# Patient Record
Sex: FEMALE | Race: ASIAN | Employment: UNEMPLOYED | URBAN - METROPOLITAN AREA
[De-identification: names, ages, dates, MRNs, and addresses within clinical notes are randomized per-mention and may not be internally consistent; named-entity substitution may affect disease eponyms.]

---

## 2017-02-03 ENCOUNTER — HOSPITAL ENCOUNTER (OUTPATIENT)
Dept: FAMILY PLANNING/WOMEN'S HEALTH CLINIC | Age: 27
Discharge: HOME | End: 2017-02-03
Payer: COMMERCIAL

## 2017-02-03 PROCEDURE — 99202 OFFICE O/P NEW SF 15 MIN: CPT

## 2017-02-03 NOTE — DISCHARGE INSTRUCTIONS
INCREASING MILK SUPPLY    [address] follows the recommendations of the Academy of Breastfeeding Medicine and International Lactation [employer] Association for increasing milk supply. These recommendations are based upon current evidence and apply to women experiencing difficulties with a low rate of milk production. · Skin to skin contact between mother and baby encourages frequent feeding and stimulates the release of hormones that help the milk to let down. · Lactating women need to maintain adequate calorie and fluid intake and rest when baby is sleeping. Try to take in an additional 500 calories per day per baby. Drink to thirst but it is not necessary to force fluids. · Self breast massage prior to feeding and breast compression while the baby is nursing may help to increase the intake of breast milk. Massage, light stroking toward the nipple and gentle shaking while leaning forward when baby is not latched on may help stimulate a second let down. · Relaxation techniques such as deep breathing, meditation, soft music and dim lighting may help to facilitate let down. · Increase stimulation and milk removal by feeding frequently and latching baby deeply to promote thorough drainage of the breasts. Pump if baby is not latching. (Minimum 8 feedings/pumpings in a 24 hour period and 12 times maximum)  · Pumping for 10-15 minutes after breastfeeding with an automatic cycling breast pump that is capable of draining both breasts, hospital grade if available, will increase stimulation of the breasts and increase milk production. It may take several (2-3) days before you notice an increase. The pump should be adjusted to the highest maximum comfortable vacuum to enhance milk flow and milk yield. Flange size is important so your nipple can move freely in the flange as not to cause the nipple to swell. Graduate vacuum suction to feel like babys suck.   You can use the letdown button several times during your pumping session to promote more efficient drainage of the breast.  · Hand expression after pumping may increase milk yield and fat content of milk. You may find it helpful to view the video demonstrating several hand expression techniques found at: http://[email or web address]/[email or web address] html  · Anemia, thyroid malfunction, polycystic ovary syndrome and hormonal birth control such as the mini pill or Depo-provera can impact production in some women and may need to be evaluated by your healthcare provider. · If taking thyroid medicine, thyroid levels should be periodically checked and medicine adjusted as needed. · Contact your healthcare provider regarding pharmacological or herbal products to increase milk supply. Avoid products that contain peppermint (Altoids, Starlight mints) and carlee. Mothers Milk Tea (Traditional Medicinals or Yogi brand)   · Steep bag in water for at least 10 minutes  · Can make hot or cold  · Can add sweetener or mix with any decaf, non- peppermint tea or lemonade  · May drink 2-6 cups per day    Fenugreek Capsules (approx 600mg)  · May take up to 9 per day  · Split into 3 doses approximately 7 hours apart  · Do not use if diabetic or allergic to garbanzo beans/ severe peanut allergy/blood thinners  · May affect thyroid/asthma symptoms    More Milk Plus by [employer] (Tincture or Capsules)        Take as directed on package    Lactation Plus by [employer] (capsules)  · Take as directed on package    Mothers Milk Cookie Recipes  · [email or web address] Development.au. html    · DeathPrevention.it      HELPING BABY LATCH CORRECTLY                                             1. Stimulate the rooting reflex by making a circular motion around the lips, in one direction.  Give baby verbal and visual cues by saying Katrina Clap and demonstrating with your face approximately 10 inches away from babys face to elicit a wide, open mouth. 2. Stimulate let down by massaging or pumping breasts for 1-2 minutes prior to latching. 3. Allow baby to suck clean finger, placed nail side down. 4. Place nursing pillow or bed pillows next to you high enough to bring baby to the level of the nipple. 5. Position baby belly to belly with you, making sure that head, neck and torso are in alignment. 6. Support babys head during latch. Babys body weight should rest comfortably on nursing pillow. 7. Roll the nipple gently between the fingers to make it stand out. 8. Fingers should be placed at the edge of areola and the breast should be compressed so that it enters parallel to babys open mouth, with the nipple slightly pointed toward the roof of the babys mouth. (Think of compressing a large sandwich to fit into your mouth!)   9. Wait for WIDE mouth, and then bring the baby to the breast, getting as much of the areola into the mouth as possible. Gentle downward pressure on the lower jaw as baby is coming to breast, may be helpful in keeping mouth open wide initially. 10. Hold the breast compressed and the baby gently in place to allow the baby to feel the nipple and begin suckling.  (U-hold may be helpful)  11. If baby has not latched correctly, begin the process again. 12. If baby is latched correctly, lips should be flanged outward, nose and chin should lightly   touch the breast.  No clicking or lip smacking should be heard. 13. Swallowing should be observed intermittently during entire feeding. Baby should pause   for breathing. 14. It is normal to have some brief discomfort initially when the baby latches, but it should  [place] as the feeding progresses, particularly once the milk lets down. If baby tends to compress nipple during feedings, un-latch after first let-down, roll nipples into round shape and re-latch.     PUMP EVERY 2 to 3 hours during the day and every 4 hours at night (only take two   4 hour blocks of time. )  Use a 27 mm breast shield for pumping. Consider a [employer]IG[employer] breast pump rental if no increased supply noted with Personal use pump within one week. Try Mother's Milk Tea in addition to herbal supplement currently taking. Continue to try to feed at breast as often as possible and try offering breast after pumping some to initiate flow of milk.

## 2017-02-03 NOTE — PROGRESS NOTES
[address]  [address]  [address] [place] [address] Drive, [address] [facility] [address]  Dept: [phone]    LACTATION REPORT TO HEALTHCARE PROVIDER    Date: 2/3/2017    Dear Winsome Kang MD: Roberto Yates: This is to inform you that I have seen    Baby name (First Name, Last Name): (P) Jhon Del Cid                                  Mother: Sharon Tesfaye    Reason for Visit: (P) difficulty latching; Other (Comment) (low supply)    Baby date of birth: (P) [birth year]     Baby's Gestational age: 36  Birth Weight ([facility]): 3.209 kg (7 lb 1.2 oz)           Date                  Weight                       Recorded Weight 2: 3.731 kg (8 lb 3.6 oz) (naked)    Pre-feed Weight: 3.748 kg (8 lb 4.2 oz)           Total amount of milk transferred:  0    Total time of feedin refused to feed at breast, few sucks only    Amount of milk pumped (PC): 30 ml    Amount of milk/formula supplemented: 60 formula, 30 breastmilk    Breast Assessment:  Left Breast: Medium  Left Nipple: Everted; Short; Intact  Right Breast: Medium  Right Nipple: Everted; Short; Intact    Oral assessement: WDL     Mother in for assistance getting baby to take breast. Reports that there was a delay in initiating breastfeeding after birth due to aspiration of meconium and that infant never fed well at breast in the hospital. After discharge home she has continued to try but states, \"he doesn't like the breast because it doesn't come fast enough for him and he is not patient enough. \" She has been pumping and giving ebm and formula by bottle; only pumping 4 X every 24 hours, and using a personal use Medela breast pump and 24 mm flanges. She also reports that her nipples do not stick out enough for the baby. Mother pumped for 5 minutes to initiate flow of milk and hand expression used as well to entice infant to breast. Several positions, both sides, with and without nipple shield attempted.  Infant will only suckle a few times, then fusses and pulls away. Discussed mothers goals of increasing supply to feed more breastmilk by bottle as well as getting infant to breast and realistic time frames to accomplish both. Encouraged her to continue trying to feed at breast as well as skin to skin cuddling time, switching to a slower flow nipple or Medela Calma nipple. Strongly encouraged the rental of a hospital grade Symphony breast pump to build supply as well as pumping on a frequent regular basis. Also fitted with 27 mm breast shield size. Information also reviewed on natural and herbal galactagogues, and sample of Mother's Milk Tea given. Mother chooses to try increasing the frequency of pumping with her pump first, declined rental at this time. Enclosed please find a copy of the instructions given to the patient. Please feel free to contact me at [facility] with any questions or concerns.     Thank you,    Berna Torres RN